# Patient Record
Sex: MALE | Race: WHITE | URBAN - METROPOLITAN AREA
[De-identification: names, ages, dates, MRNs, and addresses within clinical notes are randomized per-mention and may not be internally consistent; named-entity substitution may affect disease eponyms.]

---

## 2019-01-30 ENCOUNTER — NEW PATIENT (OUTPATIENT)
Dept: URBAN - METROPOLITAN AREA CLINIC 51 | Facility: CLINIC | Age: 54
End: 2019-01-30
Payer: COMMERCIAL

## 2019-01-30 DIAGNOSIS — H25.13 AGE-RELATED NUCLEAR CATARACT, BILATERAL: Primary | ICD-10-CM

## 2019-01-30 PROCEDURE — 92004 COMPRE OPH EXAM NEW PT 1/>: CPT | Performed by: OPTOMETRIST

## 2019-01-30 PROCEDURE — 92015 DETERMINE REFRACTIVE STATE: CPT | Performed by: OPTOMETRIST

## 2019-01-30 ASSESSMENT — VISUAL ACUITY
OS: 20/20
OD: 20/20

## 2019-01-30 ASSESSMENT — INTRAOCULAR PRESSURE
OD: 7
OS: 7

## 2019-01-30 ASSESSMENT — KERATOMETRY
OS: 44.88
OD: 44.70

## 2022-03-15 ENCOUNTER — OFFICE VISIT (OUTPATIENT)
Dept: URBAN - METROPOLITAN AREA CLINIC 51 | Facility: CLINIC | Age: 57
End: 2022-03-15
Payer: COMMERCIAL

## 2022-03-15 DIAGNOSIS — H25.11 AGE-RELATED NUCLEAR CATARACT, RIGHT EYE: Primary | ICD-10-CM

## 2022-03-15 DIAGNOSIS — H52.4 PRESBYOPIA: ICD-10-CM

## 2022-03-15 DIAGNOSIS — H10.45 OTHER CHRONIC ALLERGIC CONJUNCTIVITIS: ICD-10-CM

## 2022-03-15 DIAGNOSIS — H25.812 COMBINED FORMS OF AGE-RELATED CATARACT, LEFT EYE: ICD-10-CM

## 2022-03-15 DIAGNOSIS — H43.313 VITREOUS MEMBRANES AND STRANDS, BILATERAL: ICD-10-CM

## 2022-03-15 PROCEDURE — 92134 CPTRZ OPH DX IMG PST SGM RTA: CPT | Performed by: OPTOMETRIST

## 2022-03-15 PROCEDURE — 92004 COMPRE OPH EXAM NEW PT 1/>: CPT | Performed by: OPTOMETRIST

## 2022-03-15 ASSESSMENT — INTRAOCULAR PRESSURE
OD: 10
OS: 12

## 2022-03-15 ASSESSMENT — KERATOMETRY
OS: 44.73
OD: 44.62

## 2022-03-15 ASSESSMENT — VISUAL ACUITY
OS: 20/20
OD: 20/20

## 2022-03-15 NOTE — IMPRESSION/PLAN
Impression: Age-related nuclear cataract, right eye: H25.11. Plan: Educated patient on findings. Cataracts are mild and not visually significant or effecting ADLs/vision. No treatment is currently warranted due to current level of vision. RTC with any changes/ worsening in vision. Will re-evaluate on return visit.

## 2022-03-15 NOTE — IMPRESSION/PLAN
Impression: Combined forms of age-related cataract, left eye: H25.812. Plan: Educated patient on findings. Cataracts are mild and not visually significant or effecting ADLs/vision. No treatment is currently warranted due to current level of vision. RTC with any changes/ worsening in vision. Will re-evaluate on return visit.

## 2022-03-15 NOTE — IMPRESSION/PLAN
Impression: Other chronic allergic conjunctivitis: H10.45. Plan: Can use OTC allergy drops (Ex. Kathy Common or [place]) as needed for allergies/itching.